# Patient Record
Sex: MALE | Race: BLACK OR AFRICAN AMERICAN | NOT HISPANIC OR LATINO | URBAN - METROPOLITAN AREA
[De-identification: names, ages, dates, MRNs, and addresses within clinical notes are randomized per-mention and may not be internally consistent; named-entity substitution may affect disease eponyms.]

---

## 2023-03-19 PROBLEM — R36.1 HEMATOSPERMIA: Status: ACTIVE | Noted: 2023-03-19

## 2023-03-19 PROBLEM — J30.2 SEASONAL ALLERGIES: Status: ACTIVE | Noted: 2023-03-19
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
## 2023-03-19 PROBLEM — Z86.16 HISTORY OF 2019 NOVEL CORONAVIRUS DISEASE (COVID-19): Status: ACTIVE | Noted: 2023-03-19

## 2023-03-19 PROBLEM — E66.3 OVERWEIGHT (BMI 25.0-29.9): Status: ACTIVE | Noted: 2023-03-19

## 2023-03-19 PROBLEM — R53.1 WEAK: Status: ACTIVE | Noted: 2023-03-19

## 2023-03-19 PROBLEM — M79.602 PAIN WITH RAISING OF LEFT UPPER EXTREMITY: Status: ACTIVE | Noted: 2023-03-19

## 2023-03-19 PROBLEM — M25.569: Status: ACTIVE | Noted: 2023-03-19

## 2023-03-19 PROBLEM — H10.9 CONJUNCTIVITIS: Status: ACTIVE | Noted: 2023-03-19

## 2023-03-19 PROBLEM — B35.1 ONYCHOMYCOSIS OF GREAT TOE: Status: ACTIVE | Noted: 2023-03-19

## 2023-03-19 PROBLEM — B35.3 TINEA PEDIS: Status: ACTIVE | Noted: 2023-03-19

## 2023-03-19 PROBLEM — N41.9 PROSTATITIS: Status: ACTIVE | Noted: 2023-03-19

## 2023-03-19 PROBLEM — R31.9 HEMATURIA: Status: ACTIVE | Noted: 2023-03-19

## 2023-03-19 RX ORDER — LIDOCAINE HCL 4 G/100G
CREAM TOPICAL
COMMUNITY
Start: 2020-06-01

## 2023-03-19 RX ORDER — FLUTICASONE PROPIONATE 50 MCG
SPRAY, SUSPENSION (ML) NASAL
COMMUNITY
Start: 2020-06-01 | End: 2023-03-24 | Stop reason: SDUPTHER

## 2023-03-19 RX ORDER — TERBINAFINE HYDROCHLORIDE 250 MG/1
TABLET ORAL
COMMUNITY
Start: 2022-10-04

## 2023-03-19 RX ORDER — ERGOCALCIFEROL 1.25 MG/1
CAPSULE ORAL
COMMUNITY
End: 2023-03-24 | Stop reason: ALTCHOICE

## 2023-03-19 RX ORDER — ALBUTEROL SULFATE 0.83 MG/ML
SOLUTION RESPIRATORY (INHALATION)
COMMUNITY
End: 2023-03-24 | Stop reason: SDUPTHER

## 2023-03-19 RX ORDER — ACETAMINOPHEN 500 MG
TABLET ORAL
COMMUNITY
Start: 2020-06-01 | End: 2023-03-24 | Stop reason: SDUPTHER

## 2023-03-24 ENCOUNTER — LAB (OUTPATIENT)
Dept: LAB | Facility: LAB | Age: 48
End: 2023-03-24
Payer: MEDICAID

## 2023-03-24 ENCOUNTER — OFFICE VISIT (OUTPATIENT)
Dept: PRIMARY CARE | Facility: CLINIC | Age: 48
End: 2023-03-24
Payer: MEDICAID

## 2023-03-24 VITALS
DIASTOLIC BLOOD PRESSURE: 86 MMHG | HEART RATE: 77 BPM | OXYGEN SATURATION: 99 % | SYSTOLIC BLOOD PRESSURE: 124 MMHG | BODY MASS INDEX: 28.45 KG/M2 | TEMPERATURE: 97 F | WEIGHT: 233.6 LBS | HEIGHT: 76 IN

## 2023-03-24 DIAGNOSIS — R06.00 DYSPNEA, UNSPECIFIED TYPE: ICD-10-CM

## 2023-03-24 DIAGNOSIS — Z00.00 HEALTHCARE MAINTENANCE: ICD-10-CM

## 2023-03-24 DIAGNOSIS — J30.2 SEASONAL ALLERGIES: Primary | ICD-10-CM
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
## 2023-03-24 LAB — CALCIDIOL (25 OH VITAMIN D3) (NG/ML) IN SER/PLAS: 62 NG/ML

## 2023-03-24 PROCEDURE — 1036F TOBACCO NON-USER: CPT | Performed by: STUDENT IN AN ORGANIZED HEALTH CARE EDUCATION/TRAINING PROGRAM

## 2023-03-24 PROCEDURE — 36415 COLL VENOUS BLD VENIPUNCTURE: CPT

## 2023-03-24 PROCEDURE — 82306 VITAMIN D 25 HYDROXY: CPT

## 2023-03-24 PROCEDURE — 99213 OFFICE O/P EST LOW 20 MIN: CPT | Performed by: STUDENT IN AN ORGANIZED HEALTH CARE EDUCATION/TRAINING PROGRAM

## 2023-03-24 RX ORDER — ALBUTEROL SULFATE 0.83 MG/ML
2.5 SOLUTION RESPIRATORY (INHALATION) EVERY 6 HOURS PRN
Qty: 75 ML | Refills: 1 | Status: SHIPPED | OUTPATIENT
Start: 2023-03-24

## 2023-03-24 RX ORDER — ACETAMINOPHEN 500 MG
1000 TABLET ORAL EVERY 8 HOURS PRN
Qty: 90 TABLET | Refills: 1 | Status: SHIPPED | OUTPATIENT
Start: 2023-03-24

## 2023-03-24 RX ORDER — CETIRIZINE HYDROCHLORIDE 10 MG/1
10 TABLET ORAL DAILY
Qty: 30 TABLET | Refills: 11 | Status: SHIPPED | OUTPATIENT
Start: 2023-03-24 | End: 2024-03-23

## 2023-03-24 RX ORDER — EPINEPHRINE 0.3 MG/.3ML
1 INJECTION SUBCUTANEOUS ONCE
Qty: 2 EACH | Refills: 1 | Status: SHIPPED | OUTPATIENT
Start: 2023-03-24 | End: 2023-10-04 | Stop reason: SDUPTHER

## 2023-03-24 RX ORDER — FLUTICASONE PROPIONATE 50 MCG
2 SPRAY, SUSPENSION (ML) NASAL DAILY
Qty: 16 G | Refills: 1 | Status: SHIPPED | OUTPATIENT
Start: 2023-03-24 | End: 2023-10-13 | Stop reason: SDUPTHER

## 2023-03-24 RX ORDER — ACETAMINOPHEN 500 MG
50 TABLET ORAL DAILY
Qty: 90 CAPSULE | Refills: 1 | Status: SHIPPED | OUTPATIENT
Start: 2023-03-24

## 2023-03-24 ASSESSMENT — PAIN SCALES - GENERAL: PAINLEVEL: 0-NO PAIN

## 2023-03-24 NOTE — PROGRESS NOTES
HPI    Seasonal allergies  - Endorses having congestion, runny nose  - Has been occurring for the past month   - Occurs yearly  - Endorses taking Zyrtec and Flonase; has run out of Flonase   - Reports that allergies are well managed with this regimen  - Endorses taking herbal supplements for alleviation of symptoms (Alfafa, and Stinging nettle)  - endorses some yellow nasal discharge  - Endorses a history of sinus infection; last one about 6 months ago    Review of Systems  ROS negative unless mentioned in HPI     Physical Exams  Constitutional: Well developed, awake, alert, oriented x3  Head and Face: NCAT  Eyes: Normal external exam, EOMI  ENT: Normal external inspection of ears and nose. Oropharynx normal.  Cardiovascular: RRR, S1/S2, no murmurs, rubs, or gallops, radial pulses +2, no edema of extremities  Pulmonary: CTAB, no respiratory distress.  Abdomen: +BS, soft, non-tender, nondistended, no guarding or rebound, no masses noted  Neuro: A&O x3, CN II-XII grossly intact  MSK: No joint swelling, normal movements of all extremities. Range of motion- normal.  Skin- No lesions, contusions, or erythema.  Psychiatric: Judgment intact. Appropriate mood and behavior     Plan  47-year-old male presenting to clinic today with a concern of seasonal allergies.    #Congestion  #Runny nose  -Symptoms consistent with allergic rhinitis  -Continue with Zyrtec 10 mg daily  -Continue with Flonase 50 mcg/ACT  -Given epinephrine pen due to previous 1 being     Plan patient return to clinic in the next 2 to 3 months for continued follow-up.    Discussed with Dr. Rex Rodarte MD PGY-3  Family Medicine   [facility]

## 2023-04-26 NOTE — PROGRESS NOTES
I reviewed with the resident the medical history and the resident’s findings on physical examination.  I discussed with the resident the patient’s diagnosis and concur with the treatment plan as documented in the resident note.     Marc Goodman MD

## 2023-10-04 DIAGNOSIS — J30.2 SEASONAL ALLERGIES: ICD-10-CM

## 2023-10-04 RX ORDER — EPINEPHRINE 0.3 MG/.3ML
1 INJECTION SUBCUTANEOUS ONCE
Qty: 0.3 ML | Refills: 0 | Status: SHIPPED | OUTPATIENT
Start: 2023-10-04 | End: 2023-10-04

## 2023-10-06 ENCOUNTER — APPOINTMENT (OUTPATIENT)
Dept: PRIMARY CARE | Facility: CLINIC | Age: 48
End: 2023-10-06
Payer: MEDICAID

## 2023-10-09 ENCOUNTER — APPOINTMENT (OUTPATIENT)
Dept: PRIMARY CARE | Facility: HOSPITAL | Age: 48
End: 2023-10-09
Payer: MEDICAID

## 2023-10-13 ENCOUNTER — OFFICE VISIT (OUTPATIENT)
Dept: PRIMARY CARE | Facility: CLINIC | Age: 48
End: 2023-10-13
Payer: MEDICAID

## 2023-10-13 ENCOUNTER — LAB (OUTPATIENT)
Dept: LAB | Facility: LAB | Age: 48
End: 2023-10-13
Payer: MEDICAID

## 2023-10-13 VITALS
WEIGHT: 233.8 LBS | SYSTOLIC BLOOD PRESSURE: 123 MMHG | HEART RATE: 73 BPM | HEIGHT: 76 IN | OXYGEN SATURATION: 100 % | DIASTOLIC BLOOD PRESSURE: 81 MMHG | TEMPERATURE: 98.2 F | BODY MASS INDEX: 28.47 KG/M2 | RESPIRATION RATE: 18 BRPM

## 2023-10-13 DIAGNOSIS — R45.89 DEPRESSED MOOD: Primary | ICD-10-CM

## 2023-10-13 DIAGNOSIS — Z11.59 ENCOUNTER FOR HCV SCREENING TEST FOR LOW RISK PATIENT: ICD-10-CM

## 2023-10-13 DIAGNOSIS — Z11.4 ENCOUNTER FOR SCREENING FOR HIV: ICD-10-CM

## 2023-10-13 DIAGNOSIS — J30.2 SEASONAL ALLERGIES: ICD-10-CM

## 2023-10-13 DIAGNOSIS — E66.3 OVERWEIGHT (BMI 25.0-29.9): ICD-10-CM

## 2023-10-13 DIAGNOSIS — N52.9 ERECTILE DYSFUNCTION, UNSPECIFIED ERECTILE DYSFUNCTION TYPE: ICD-10-CM

## 2023-10-13 DIAGNOSIS — R45.89 DEPRESSED MOOD: ICD-10-CM

## 2023-10-13 PROBLEM — R53.1 WEAK: Status: RESOLVED | Noted: 2023-03-19 | Resolved: 2023-10-13

## 2023-10-13 PROBLEM — Z86.16 HISTORY OF 2019 NOVEL CORONAVIRUS DISEASE (COVID-19): Status: RESOLVED | Noted: 2023-03-19 | Resolved: 2023-10-13

## 2023-10-13 PROBLEM — R36.1 HEMATOSPERMIA: Status: RESOLVED | Noted: 2023-03-19 | Resolved: 2023-10-13

## 2023-10-13 PROBLEM — F32.9 MAJOR DEPRESSION, CHRONIC: Status: ACTIVE | Noted: 2018-04-02

## 2023-10-13 PROBLEM — H10.9 CONJUNCTIVITIS: Status: RESOLVED | Noted: 2023-03-19 | Resolved: 2023-10-13

## 2023-10-13 PROBLEM — B35.3 TINEA PEDIS: Status: RESOLVED | Noted: 2023-03-19 | Resolved: 2023-10-13

## 2023-10-13 PROBLEM — M26.609 TMJ DYSFUNCTION: Status: ACTIVE | Noted: 2023-10-13

## 2023-10-13 PROCEDURE — 99213 OFFICE O/P EST LOW 20 MIN: CPT | Performed by: STUDENT IN AN ORGANIZED HEALTH CARE EDUCATION/TRAINING PROGRAM

## 2023-10-13 PROCEDURE — 82465 ASSAY BLD/SERUM CHOLESTEROL: CPT

## 2023-10-13 PROCEDURE — 85027 COMPLETE CBC AUTOMATED: CPT

## 2023-10-13 PROCEDURE — 84402 ASSAY OF FREE TESTOSTERONE: CPT

## 2023-10-13 PROCEDURE — 80053 COMPREHEN METABOLIC PANEL: CPT

## 2023-10-13 PROCEDURE — 83036 HEMOGLOBIN GLYCOSYLATED A1C: CPT

## 2023-10-13 PROCEDURE — 1036F TOBACCO NON-USER: CPT | Performed by: STUDENT IN AN ORGANIZED HEALTH CARE EDUCATION/TRAINING PROGRAM

## 2023-10-13 PROCEDURE — 83718 ASSAY OF LIPOPROTEIN: CPT

## 2023-10-13 PROCEDURE — 87389 HIV-1 AG W/HIV-1&-2 AB AG IA: CPT

## 2023-10-13 PROCEDURE — 86803 HEPATITIS C AB TEST: CPT

## 2023-10-13 PROCEDURE — 84443 ASSAY THYROID STIM HORMONE: CPT

## 2023-10-13 PROCEDURE — 36415 COLL VENOUS BLD VENIPUNCTURE: CPT

## 2023-10-13 RX ORDER — FLUTICASONE PROPIONATE 50 MCG
1 SPRAY, SUSPENSION (ML) NASAL 2 TIMES DAILY
Qty: 16 G | Refills: 3 | Status: SHIPPED | OUTPATIENT
Start: 2023-10-13

## 2023-10-13 ASSESSMENT — ENCOUNTER SYMPTOMS
OCCASIONAL FEELINGS OF UNSTEADINESS: 0
LOSS OF SENSATION IN FEET: 0
DEPRESSION: 0

## 2023-10-13 ASSESSMENT — PAIN SCALES - GENERAL: PAINLEVEL: 0-NO PAIN

## 2023-10-13 NOTE — PROGRESS NOTES
"Subjective   Patient ID: Luis E is a 47 y.o. male with PMH of depression, seasonal allergies, TMJ dysfunction, BPH, and prostatitis who presents for Establish Care (NPV).    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  - has never been diagnosed with ADD before, \"but I've been like this most of my life\"; does feel that it's been worsening over the past year  - cousin had an accident a year ago and   - daughter  in   - feels that his mind is spinning, the erratic nature of these thoughts is very draining, \"then I'm too tired to work\"  - when younger, had trouble with dyslexia but didn't get the support he felt he needed  - frustrated that \"I can't do the things that are necessary for me to show my skill\"  - does feel that this is mainly at work rather than at home    # Nose  - seasonal allergies, exacerbated by mold (work environment) and cigarette smoke (people around him)  - previously has had breakout of blisters inside his nose, reoccuring about once per month, always on the right side, can be either near the mustache or up higher    # Erectile dysfunction  - recent, tried to \"eat different\" with supplements, \"when I eat too much sugar I feel the effects\"  - has a very strict diet  - has been losing hair on his crown and his legs  - concerned about his testosterone due to additional perceived physical weakness      12 system ROS completed, negative except as noted above.    Current Outpatient Medications on File Prior to Visit   Medication Sig Dispense Refill    acetaminophen (Tylenol) 500 mg tablet Take 2 tablets (1,000 mg) by mouth every 8 hours if needed for mild pain (1 - 3). TAKE 1-2 TABLETs BY MOUTH EVERY 8 " "HOURS AS NEEDED FOR knee pain. 90 tablet 1    albuterol 2.5 mg /3 mL (0.083 %) nebulizer solution Take 3 mL (2.5 mg) by nebulization every 6 hours if needed for wheezing. 75 mL 1    cetirizine (ZyrTEC) 10 mg tablet Take 1 tablet (10 mg) by mouth once daily. 30 tablet 11    cholecalciferol (Vitamin D3) 50 mcg (2,000 unit) capsule Take 1 capsule (50 mcg) by mouth once daily. 90 capsule 1    EPINEPHrine 0.3 mg/0.3 mL injection syringe Inject 0.3 mL (0.3 mg) into the shoulder, thigh, or buttocks 1 time for 1 dose. 0.3MG/0.3ML Auto-injector 0.3 mL 0    lidocaine (lidocaine HCL) 4 % cream USE TOPICALLY 2-3 times per day as needed FOR PAIN.      terbinafine (LamISIL) 250 mg tablet TAKE 1 TABLET DAILY AS DIRECTED.       No current facility-administered medications on file prior to visit.        Objective   Vitals: /81 (BP Location: Right arm, Patient Position: Sitting, BP Cuff Size: Adult)   Pulse 73   Temp 36.8 °C (98.2 °F) (Temporal)   Resp 18   Ht 1.93 m (6' 4\")   Wt 106 kg (233 lb 12.8 oz)   SpO2 100%   BMI 28.46 kg/m²      Physical Exam  Vitals reviewed.   Constitutional:       General: He is not in acute distress.     Appearance: Normal appearance. He is not ill-appearing or toxic-appearing.   HENT:      Head: Normocephalic and atraumatic.      Nose: Nose normal.   Eyes:      Extraocular Movements: Extraocular movements intact.      Conjunctiva/sclera: Conjunctivae normal.      Pupils: Pupils are equal, round, and reactive to light.   Cardiovascular:      Rate and Rhythm: Normal rate and regular rhythm.      Pulses: Normal pulses.      Heart sounds: No murmur heard.     No friction rub. No gallop.   Pulmonary:      Effort: Pulmonary effort is normal. No respiratory distress.      Breath sounds: Normal breath sounds. No wheezing, rhonchi or rales.   Abdominal:      General: There is no distension.      Palpations: Abdomen is soft.      Tenderness: There is no abdominal tenderness. There is no guarding or " rebound.   Musculoskeletal:         General: No swelling or tenderness. Normal range of motion.      Cervical back: Normal range of motion. No tenderness.      Right lower leg: No edema.      Left lower leg: No edema.   Skin:     General: Skin is warm and dry.      Capillary Refill: Capillary refill takes less than 2 seconds.      Findings: No rash.   Neurological:      General: No focal deficit present.      Mental Status: He is alert and oriented to person, place, and time. Mental status is at baseline.      Cranial Nerves: No cranial nerve deficit.      Motor: No weakness.      Gait: Gait normal.   Psychiatric:         Mood and Affect: Mood is depressed. Affect is tearful.         Speech: Speech normal.         Behavior: Behavior normal.         Cognition and Memory: Cognition normal.       Assessment/Plan   Problem List Items Addressed This Visit       Overweight (BMI 25.0-29.9)    Relevant Orders    Comprehensive metabolic panel (Completed)    Hemoglobin A1c (Completed)    Lipid Panel Non-Fasting (Completed)    Seasonal allergies     Discussed appropriate use of flonase. On chart review, has h/o herpes labialis, which is the most likely etiology of his blisters. No active blisters or ulcers at this time.         Relevant Medications    fluticasone (Flonase) 50 mcg/actuation nasal spray    Depressed mood - Primary     prior h/o depression, extensive supportive listening provided today, discussed medications and counseling, patient interested in both. d/t potential additional PTSD, anxiety, and/or ADHD, to refer to psychiatry for more in depth testing. Given community referral list and encouraged to call, patient agreeable. No active suicidality, no lethal means at home. Will r/o anemia and thyroid abnormality.         Relevant Orders    CBC (Completed)    Tsh With Reflex To Free T4 If Abnormal (Completed)    Referral to Psychiatry    Erectile dysfunction     Unable to evaluate in depth today due to time  constraints, however patient voiced specific concern about his testosterone level; if low, this could certainly contribute to depressed mood.         Relevant Orders    Testosterone, total and free (Completed)     Other Visit Diagnoses       Encounter for HCV screening test for low risk patient        Relevant Orders    Hepatitis C Antibody (Completed)    Encounter for screening for HIV        Relevant Orders    HIV 1/2 Antigen/Antibody Screen with Reflex to Confirmation (Completed)          Patient Attending Supervision: discussed with attending physician (cosigner listed on this note).    RTC in 3 months for routine health maintenance, or earlier as needed.    Sujata Springer MD  Family Medicine PGY-3  [facility]

## 2023-10-14 LAB
ALBUMIN SERPL BCP-MCNC: 4.5 G/DL (ref 3.4–5)
ALP SERPL-CCNC: 73 U/L (ref 33–120)
ALT SERPL W P-5'-P-CCNC: 45 U/L (ref 10–52)
ANION GAP SERPL CALC-SCNC: 12 MMOL/L (ref 10–20)
AST SERPL W P-5'-P-CCNC: 31 U/L (ref 9–39)
BILIRUB SERPL-MCNC: 0.4 MG/DL (ref 0–1.2)
BUN SERPL-MCNC: 13 MG/DL (ref 6–23)
CALCIUM SERPL-MCNC: 9.5 MG/DL (ref 8.6–10.6)
CHLORIDE SERPL-SCNC: 100 MMOL/L (ref 98–107)
CHOLEST SERPL-MCNC: 165 MG/DL (ref 0–199)
CHOLESTEROL/HDL RATIO: 4.3
CO2 SERPL-SCNC: 30 MMOL/L (ref 21–32)
CREAT SERPL-MCNC: 1.22 MG/DL (ref 0.5–1.3)
ERYTHROCYTE [DISTWIDTH] IN BLOOD BY AUTOMATED COUNT: 12.5 % (ref 11.5–14.5)
EST. AVERAGE GLUCOSE BLD GHB EST-MCNC: 105 MG/DL
GFR SERPL CREATININE-BSD FRML MDRD: 74 ML/MIN/1.73M*2
GLUCOSE SERPL-MCNC: 95 MG/DL (ref 74–99)
HBA1C MFR BLD: 5.3 %
HCT VFR BLD AUTO: 42.3 % (ref 41–52)
HCV AB SER QL: NONREACTIVE
HDLC SERPL-MCNC: 38.5 MG/DL
HGB BLD-MCNC: 14.7 G/DL (ref 13.5–17.5)
HIV 1+2 AB+HIV1 P24 AG SERPL QL IA: NONREACTIVE
MCH RBC QN AUTO: 29.5 PG (ref 26–34)
MCHC RBC AUTO-ENTMCNC: 34.8 G/DL (ref 32–36)
MCV RBC AUTO: 85 FL (ref 80–100)
NON-HDL CHOLESTEROL: 127 MG/DL (ref 0–149)
NRBC BLD-RTO: 0 /100 WBCS (ref 0–0)
PLATELET # BLD AUTO: 210 X10*3/UL (ref 150–450)
PMV BLD AUTO: 11 FL (ref 7.5–11.5)
POTASSIUM SERPL-SCNC: 4.2 MMOL/L (ref 3.5–5.3)
PROT SERPL-MCNC: 7.7 G/DL (ref 6.4–8.2)
RBC # BLD AUTO: 4.98 X10*6/UL (ref 4.5–5.9)
SODIUM SERPL-SCNC: 138 MMOL/L (ref 136–145)
TSH SERPL-ACNC: 1.17 MIU/L (ref 0.44–3.98)
WBC # BLD AUTO: 5.2 X10*3/UL (ref 4.4–11.3)

## 2023-10-19 LAB
TESTOSTERONE FREE (CHAN): 66.2 PG/ML (ref 35–155)
TESTOSTERONE,TOTAL,LC-MS/MS: 374 NG/DL (ref 250–1100)

## 2023-10-20 PROBLEM — N52.9 ERECTILE DYSFUNCTION: Status: ACTIVE | Noted: 2023-10-20

## 2023-10-20 PROBLEM — R45.89 DEPRESSED MOOD: Status: ACTIVE | Noted: 2023-10-20

## 2023-10-20 NOTE — PROGRESS NOTES
I reviewed the resident/fellow's documentation and discussed the patient with the resident/fellow. I agree with the resident/fellow's medical decision making as documented in the note.    Ambrocio Yeh MD

## 2023-10-20 NOTE — ASSESSMENT & PLAN NOTE
Discussed appropriate use of flonase. On chart review, has h/o herpes labialis, which is the most likely etiology of his blisters. No active blisters or ulcers at this time.

## 2023-10-20 NOTE — ASSESSMENT & PLAN NOTE
prior h/o depression, extensive supportive listening provided today, discussed medications and counseling, patient interested in both. d/t potential additional PTSD, anxiety, and/or ADHD, to refer to psychiatry for more in depth testing. Given community referral list and encouraged to call, patient agreeable. No active suicidality, no lethal means at home. Will r/o anemia and thyroid abnormality.

## 2023-10-20 NOTE — ASSESSMENT & PLAN NOTE
Unable to evaluate in depth today due to time constraints, however patient voiced specific concern about his testosterone level; if low, this could certainly contribute to depressed mood.

## 2024-03-11 ENCOUNTER — APPOINTMENT (OUTPATIENT)
Dept: PRIMARY CARE | Facility: CLINIC | Age: 49
End: 2024-03-11
Payer: MEDICAID